# Patient Record
Sex: FEMALE | Race: WHITE | NOT HISPANIC OR LATINO | Employment: OTHER | URBAN - METROPOLITAN AREA
[De-identification: names, ages, dates, MRNs, and addresses within clinical notes are randomized per-mention and may not be internally consistent; named-entity substitution may affect disease eponyms.]

---

## 2017-04-07 DIAGNOSIS — I10 ESSENTIAL HYPERTENSION: Primary | ICD-10-CM

## 2017-04-07 RX ORDER — METOPROLOL SUCCINATE 25 MG/1
TABLET, EXTENDED RELEASE ORAL
Qty: 90 TABLET | Refills: 0 | Status: SHIPPED | OUTPATIENT
Start: 2017-04-07 | End: 2017-07-14 | Stop reason: SDUPTHER

## 2017-05-30 ENCOUNTER — OFFICE VISIT (OUTPATIENT)
Dept: INTERNAL MEDICINE | Age: 82
End: 2017-05-30

## 2017-05-30 VITALS
DIASTOLIC BLOOD PRESSURE: 72 MMHG | BODY MASS INDEX: 31.86 KG/M2 | WEIGHT: 191.2 LBS | HEART RATE: 66 BPM | OXYGEN SATURATION: 97 % | HEIGHT: 65 IN | TEMPERATURE: 97.4 F | SYSTOLIC BLOOD PRESSURE: 160 MMHG

## 2017-05-30 DIAGNOSIS — I10 ESSENTIAL HYPERTENSION: Primary | ICD-10-CM

## 2017-05-30 DIAGNOSIS — E66.9 ADIPOSITY: ICD-10-CM

## 2017-05-30 PROCEDURE — 99213 OFFICE O/P EST LOW 20 MIN: CPT | Performed by: INTERNAL MEDICINE

## 2017-05-31 DIAGNOSIS — E83.52 HYPERCALCEMIA: Primary | ICD-10-CM

## 2017-05-31 LAB
ALBUMIN SERPL-MCNC: 4.7 G/DL (ref 3.5–5.2)
ALBUMIN/GLOB SERPL: 1.6 G/DL
ALP SERPL-CCNC: 66 U/L (ref 39–117)
ALT SERPL-CCNC: 21 U/L (ref 1–33)
AST SERPL-CCNC: 22 U/L (ref 1–32)
BILIRUB SERPL-MCNC: 0.5 MG/DL (ref 0.1–1.2)
BUN SERPL-MCNC: 27 MG/DL (ref 8–23)
BUN/CREAT SERPL: 27.6 (ref 7–25)
CALCIUM SERPL-MCNC: 10.6 MG/DL (ref 8.2–9.6)
CHLORIDE SERPL-SCNC: 104 MMOL/L (ref 98–107)
CO2 SERPL-SCNC: 28.8 MMOL/L (ref 22–29)
CREAT SERPL-MCNC: 0.98 MG/DL (ref 0.57–1)
GLOBULIN SER CALC-MCNC: 2.9 GM/DL
GLUCOSE SERPL-MCNC: 107 MG/DL (ref 65–99)
POTASSIUM SERPL-SCNC: 4.4 MMOL/L (ref 3.5–5.2)
PROT SERPL-MCNC: 7.6 G/DL (ref 6–8.5)
SODIUM SERPL-SCNC: 146 MMOL/L (ref 136–145)

## 2017-06-05 ENCOUNTER — RESULTS ENCOUNTER (OUTPATIENT)
Dept: INTERNAL MEDICINE | Age: 82
End: 2017-06-05

## 2017-06-05 DIAGNOSIS — E83.52 HYPERCALCEMIA: ICD-10-CM

## 2017-06-27 DIAGNOSIS — I10 ESSENTIAL HYPERTENSION: ICD-10-CM

## 2017-06-27 RX ORDER — TRIAMTERENE AND HYDROCHLOROTHIAZIDE 37.5; 25 MG/1; MG/1
TABLET ORAL
Qty: 30 TABLET | Refills: 3 | Status: SHIPPED | OUTPATIENT
Start: 2017-06-27 | End: 2017-12-27 | Stop reason: SDUPTHER

## 2017-07-11 ENCOUNTER — TELEPHONE (OUTPATIENT)
Dept: INTERNAL MEDICINE | Age: 82
End: 2017-07-11

## 2017-07-14 DIAGNOSIS — I10 ESSENTIAL HYPERTENSION: ICD-10-CM

## 2017-07-14 RX ORDER — METOPROLOL SUCCINATE 25 MG/1
TABLET, EXTENDED RELEASE ORAL
Qty: 90 TABLET | Refills: 0 | Status: SHIPPED | OUTPATIENT
Start: 2017-07-14 | End: 2017-10-16 | Stop reason: SDUPTHER

## 2017-10-16 DIAGNOSIS — I10 ESSENTIAL HYPERTENSION: ICD-10-CM

## 2017-10-16 RX ORDER — METOPROLOL SUCCINATE 25 MG/1
TABLET, EXTENDED RELEASE ORAL
Qty: 90 TABLET | Refills: 0 | Status: SHIPPED | OUTPATIENT
Start: 2017-10-16 | End: 2018-04-29 | Stop reason: SDUPTHER

## 2017-11-30 ENCOUNTER — OFFICE VISIT (OUTPATIENT)
Dept: INTERNAL MEDICINE | Age: 82
End: 2017-11-30

## 2017-11-30 VITALS
HEIGHT: 65 IN | TEMPERATURE: 98.1 F | HEART RATE: 62 BPM | WEIGHT: 190.4 LBS | DIASTOLIC BLOOD PRESSURE: 72 MMHG | BODY MASS INDEX: 31.72 KG/M2 | SYSTOLIC BLOOD PRESSURE: 116 MMHG | OXYGEN SATURATION: 95 %

## 2017-11-30 DIAGNOSIS — E83.52 HYPERCALCEMIA: ICD-10-CM

## 2017-11-30 DIAGNOSIS — I10 ESSENTIAL HYPERTENSION: Primary | ICD-10-CM

## 2017-11-30 DIAGNOSIS — R60.0 LOCALIZED EDEMA: ICD-10-CM

## 2017-11-30 DIAGNOSIS — E66.09 CLASS 1 OBESITY DUE TO EXCESS CALORIES WITHOUT SERIOUS COMORBIDITY WITH BODY MASS INDEX (BMI) OF 31.0 TO 31.9 IN ADULT: ICD-10-CM

## 2017-11-30 PROCEDURE — 99214 OFFICE O/P EST MOD 30 MIN: CPT | Performed by: INTERNAL MEDICINE

## 2017-11-30 RX ORDER — ERGOCALCIFEROL (VITAMIN D2) 10 MCG
400 TABLET ORAL DAILY
COMMUNITY

## 2017-11-30 NOTE — PROGRESS NOTES
"Sarah VAZQUEZ Omayra / 89 y.o. / female  11/30/2017  VITALS    /72 (BP Location: Left arm, Patient Position: Sitting, Cuff Size: Adult)  Pulse 62  Temp 98.1 °F (36.7 °C) (Oral)   Ht 65\" (165.1 cm)  Wt 190 lb 6.4 oz (86.4 kg)  SpO2 95%  BMI 31.68 kg/m2    BP Readings from Last 3 Encounters:   11/30/17 116/72   05/30/17 160/72   11/29/16 135/65     Wt Readings from Last 3 Encounters:   11/30/17 190 lb 6.4 oz (86.4 kg)   05/30/17 191 lb 3.2 oz (86.7 kg)   11/29/16 194 lb 11.2 oz (88.3 kg)      Body mass index is 31.68 kg/(m^2).    CC:  Main reason(s) for today's visit: Hypertension      HPI:     She presents today to follow-up on hypertension.  When last seen by me, her blood pressure is well-controlled.  Today she is compliant with medication, and dietary salt restriction.  She walks at least 3 times weekly, but does not monitor blood pressure the outpatient setting.  There are no medication side effects noted at this time, patient did have one episode of orthostasis, which seemed to improve with increased fluid.  We did discuss the importance of maintaining adequate fluid intake since she is on a diuretic.    Patient has had 2 minor elevations of calcium in June 2016 and May 2017.  Calcium was 9.5 and 10.6.  This will be updated today.  In the meantime, patient has reduced her calcium intake voluntarily.    Patient Care Team:  Dewayne Coe MD as PCP - General  Dewayne Coe MD as PCP - Family Medicine  Dewayne Coe MD as PCP - Claims Attributed  ____________________________________________________________________    ASSESSMENT & PLAN:    Problem List Items Addressed This Visit        Unprioritized    Edema    Hypertension - Primary    Relevant Medications    triamterene-hydrochlorothiazide (MAXZIDE-25) 37.5-25 MG per tablet    metoprolol succinate XL (TOPROL-XL) 25 MG 24 hr tablet    Adiposity    Hypercalcemia    Overview     10.6 May 31, 2017         Relevant Orders    PTH, Intact & Calcium    "     Orders Placed This Encounter   Procedures   • PTH, Intact & Calcium       Summary/Discussion:  · Number-one hypertension stable on current medication.  Plan: Continue same, blood pressure check 6 months.  ·   · #2 weight control, needs improvement, patient advised to watch caloric intake at this point.  ·   · #3 history of hypercalcemia times one, we'll check calcium and PTH today, if still elevated, we may need to discontinue the vitamin D supplement.  ·   · #4 localized edema secondary to dependency, patient advised to elevate her legs when not active.    Return in about 6 months (around 5/30/2018) for Blood pressure check.    No future appointments.    ______________________________________________________    REVIEW OF SYSTEMS    Review of Systems   Respiratory: Negative for chest tightness and shortness of breath.    Cardiovascular: Negative for chest pain and palpitations.   Neurological: Negative for dizziness, syncope, speech difficulty, weakness, light-headedness and headaches.         PHYSICAL EXAMINATION    Physical Exam   Constitutional: She is oriented to person, place, and time. She appears well-developed and well-nourished. No distress.   Cardiovascular: Normal rate, regular rhythm, normal heart sounds and intact distal pulses.  Exam reveals no gallop and no friction rub.    No murmur heard.  Pulmonary/Chest: Effort normal and breath sounds normal. She has no wheezes. She has no rales.   Musculoskeletal: She exhibits edema.   Trace bilateral lower extremity pitting edema   Neurological: She is alert and oriented to person, place, and time.   Skin: Skin is warm and dry. No rash noted.   Psychiatric: She has a normal mood and affect. Her behavior is normal. Judgment and thought content normal.   Nursing note and vitals reviewed.      REVIEWED DATA:    Labs:     Lab Results   Component Value Date     (H) 05/30/2017    K 4.4 05/30/2017    AST 22 05/30/2017    ALT 21 05/30/2017    BUN 27 (H)  05/30/2017    CREATININE 0.98 05/30/2017    CREATININE 0.90 06/21/2016    EGFRIFNONA 53 (L) 05/30/2017    EGFRIFAFRI 64 05/30/2017       Lab Results   Component Value Date     (H) 05/30/2017     (H) 06/21/2016       No results found for: LDL, HDL, TRIG, CHOLHDLRATIO    No results found for: TSH, FREET4    Lab Results   Component Value Date    WBC >30 (A) 06/21/2016       Imaging:         Medical Tests:         Summary of old records / correspondence / consultant report:         Request outside records:         ALLERGIES  Allergies   Allergen Reactions   • Penicillins Itching   • Sulfamethoxazole-Trimethoprim         MEDICATIONS  Current Outpatient Prescriptions   Medication Sig Dispense Refill   • diphenhydrAMINE-acetaminophen (TYLENOL PM)  MG tablet per tablet Take 1 tablet by mouth at night as needed for sleep.     • metoprolol succinate XL (TOPROL-XL) 25 MG 24 hr tablet TAKE ONE TABLET BY MOUTH ONCE DAILY 90 tablet 0   • triamterene-hydrochlorothiazide (MAXZIDE-25) 37.5-25 MG per tablet TAKE ONE TABLET BY MOUTH ONCE DAILY 30 tablet 3   • Vitamin D, Cholecalciferol, (CHOLECALCIFEROL) 400 units tablet Take 400 Units by mouth Daily.       No current facility-administered medications for this visit.        PFSH:     The following portions of the patient's history were reviewed and updated as appropriate: Allergies / Current Medications / Past Medical History / Surgical History / Social History / Family History    PROBLEM LIST   Patient Active Problem List   Diagnosis   • Postartificial menopausal syndrome   • Edema   • Hypertension   • Ovarian failure   • Localized edema   • Urinary frequency   • Osteoarthritis   • Adiposity   • Encounter for immunization   • Orthostasis   • Hypercalcemia       PAST MEDICAL HISTORY  Past Medical History:   Diagnosis Date   • History of degenerative disc disease    • Obesity        SURGICAL HISTORY  Past Surgical History:   Procedure Laterality Date   •  CHOLECYSTECTOMY     • REPLACEMENT TOTAL KNEE         SOCIAL HISTORY  Social History     Social History   • Marital status:      Spouse name: N/A   • Number of children: 4   • Years of education: N/A     Occupational History   • Retired RN      Social History Main Topics   • Smoking status: Former Smoker     Types: Cigarettes     Quit date:    • Smokeless tobacco: Never Used   • Alcohol use No   • Drug use: None   • Sexual activity: Not Asked     Other Topics Concern   • None     Social History Narrative       FAMILY HISTORY  Family History   Problem Relation Age of Onset   • Cancer Other    • Diabetes Other    • Heart disease Other    • Stroke Sister               **Lisa Disclaimer:   Much of this encounter note is an electronic transcription/translation of spoken language to printed text. The electronic translation of spoken language may permit erroneous, or at times, nonsensical words or phrases to be inadvertently transcribed. Although I have reviewed the note for such errors, some may still exist.

## 2017-12-01 ENCOUNTER — TELEPHONE (OUTPATIENT)
Dept: INTERNAL MEDICINE | Age: 82
End: 2017-12-01

## 2017-12-01 LAB
CALCIUM SERPL-MCNC: 9.7 MG/DL (ref 8.7–10.3)
INTACT PTH: NORMAL
PTH-INTACT SERPL-MCNC: 41 PG/ML (ref 15–65)

## 2017-12-01 NOTE — TELEPHONE ENCOUNTER
----- Message from Dewayne Coe MD sent at 12/1/2017  2:42 PM EST -----  Laboratory is acceptable at this time, no further evaluation is required .  Please continue present diet and meds/supplements.

## 2017-12-27 DIAGNOSIS — I10 ESSENTIAL HYPERTENSION: ICD-10-CM

## 2017-12-28 RX ORDER — TRIAMTERENE AND HYDROCHLOROTHIAZIDE 37.5; 25 MG/1; MG/1
TABLET ORAL
Qty: 30 TABLET | Refills: 5 | Status: SHIPPED | OUTPATIENT
Start: 2017-12-28 | End: 2019-02-19

## 2018-01-26 ENCOUNTER — TELEPHONE (OUTPATIENT)
Dept: INTERNAL MEDICINE | Age: 83
End: 2018-01-26

## 2018-01-26 NOTE — TELEPHONE ENCOUNTER
Pt called stating she is not urinating very often. She took a diuretic pill yesterday and this morning. When she does go a little its clear. No other issues. Pt said she is drinking water. She said she would keep taking the diuretic.  Pt's # [phone]  Thanks SP

## 2018-01-26 NOTE — TELEPHONE ENCOUNTER
Since she has no symptoms of urinary tract infection, nor signs of fluid retention, and has urinated very recently, I suggest we just observe this over the weekend to see if this worsens.  If she develops any difficulty with urination as far as pain, odor, bleeding to suggest infection, suggest that she present to the immediate care center.  If she develops any problems with shortness of breath, swelling in her ankles, patient should also be seen in the immediate care center over the weekend.  if not update us on Monday with her symptoms.

## 2018-01-26 NOTE — TELEPHONE ENCOUNTER
Updated info: Currently takes maxzide 37.5/25 Pt denied the following; burning, pain, smell, spasms, hematuria, CP SOA, edema BLE.  Not current with a urologist.   Last micturition was 1/26/18 @ 0945 good stream and clear in color

## 2018-01-29 ENCOUNTER — TELEPHONE (OUTPATIENT)
Dept: INTERNAL MEDICINE | Age: 83
End: 2018-01-29

## 2018-01-29 NOTE — TELEPHONE ENCOUNTER
Pt stated she was to call back today on how she was feeling regarding a UTI. Pt said all issues have resolved and she is feeling fine.  Pt's # [phone]  Thanks SP

## 2018-04-29 DIAGNOSIS — I10 ESSENTIAL HYPERTENSION: ICD-10-CM

## 2018-04-30 RX ORDER — METOPROLOL SUCCINATE 25 MG/1
TABLET, EXTENDED RELEASE ORAL
Qty: 90 TABLET | Refills: 0 | Status: SHIPPED | OUTPATIENT
Start: 2018-04-30 | End: 2018-08-13 | Stop reason: SDUPTHER

## 2018-07-03 ENCOUNTER — TELEPHONE (OUTPATIENT)
Dept: INTERNAL MEDICINE | Age: 83
End: 2018-07-03

## 2018-07-03 NOTE — TELEPHONE ENCOUNTER
Pt called stating for over a week now she has been having pain in middle of her back that has gotten worse. Pt said she is also constipated. Denies any chest pain, no s.o.a, no nausea.  Pt's # [phone]  Thanks SP

## 2018-07-03 NOTE — TELEPHONE ENCOUNTER
Called pt and she said, her  and her have already decided to go to [facility].  Thanks SP

## 2018-07-05 ENCOUNTER — APPOINTMENT (OUTPATIENT)
Dept: GENERAL RADIOLOGY | Facility: HOSPITAL | Age: 83
End: 2018-07-05

## 2018-07-05 PROCEDURE — 72110 X-RAY EXAM L-2 SPINE 4/>VWS: CPT | Performed by: GENERAL PRACTICE

## 2018-08-13 DIAGNOSIS — I10 ESSENTIAL HYPERTENSION: ICD-10-CM

## 2018-08-13 RX ORDER — METOPROLOL SUCCINATE 25 MG/1
TABLET, EXTENDED RELEASE ORAL
Qty: 30 TABLET | Refills: 0 | Status: SHIPPED | OUTPATIENT
Start: 2018-08-13 | End: 2018-09-12 | Stop reason: SDUPTHER

## 2018-09-12 DIAGNOSIS — I10 ESSENTIAL HYPERTENSION: ICD-10-CM

## 2018-09-12 RX ORDER — METOPROLOL SUCCINATE 25 MG/1
TABLET, EXTENDED RELEASE ORAL
Qty: 30 TABLET | Refills: 0 | Status: SHIPPED | OUTPATIENT
Start: 2018-09-12 | End: 2018-10-15 | Stop reason: SDUPTHER

## 2018-10-15 DIAGNOSIS — I10 ESSENTIAL HYPERTENSION: ICD-10-CM

## 2018-10-16 RX ORDER — METOPROLOL SUCCINATE 25 MG/1
25 TABLET, EXTENDED RELEASE ORAL DAILY
Qty: 15 TABLET | Refills: 0 | Status: SHIPPED | OUTPATIENT
Start: 2018-10-16 | End: 2019-03-22 | Stop reason: SDUPTHER

## 2018-11-07 DIAGNOSIS — I10 ESSENTIAL HYPERTENSION: ICD-10-CM

## 2018-11-07 RX ORDER — METOPROLOL SUCCINATE 25 MG/1
TABLET, EXTENDED RELEASE ORAL
Qty: 15 TABLET | Refills: 0 | OUTPATIENT
Start: 2018-11-07

## 2018-11-12 DIAGNOSIS — I10 ESSENTIAL HYPERTENSION: ICD-10-CM

## 2018-11-12 RX ORDER — METOPROLOL SUCCINATE 25 MG/1
TABLET, EXTENDED RELEASE ORAL
Qty: 30 TABLET | Refills: 0 | Status: SHIPPED | OUTPATIENT
Start: 2018-11-12 | End: 2019-02-19

## 2019-01-11 RX ORDER — METOPROLOL SUCCINATE 25 MG/1
TABLET, EXTENDED RELEASE ORAL
Qty: 30 TABLET | Refills: 1 | Status: SHIPPED | OUTPATIENT
Start: 2019-01-11 | End: 2019-02-19

## 2019-02-19 ENCOUNTER — OFFICE VISIT (OUTPATIENT)
Dept: FAMILY MEDICINE CLINIC | Facility: CLINIC | Age: 84
End: 2019-02-19

## 2019-02-19 VITALS
BODY MASS INDEX: 28.79 KG/M2 | TEMPERATURE: 98.7 F | DIASTOLIC BLOOD PRESSURE: 70 MMHG | HEIGHT: 68 IN | HEART RATE: 78 BPM | WEIGHT: 190 LBS | OXYGEN SATURATION: 96 % | SYSTOLIC BLOOD PRESSURE: 140 MMHG

## 2019-02-19 DIAGNOSIS — I10 ESSENTIAL HYPERTENSION: ICD-10-CM

## 2019-02-19 DIAGNOSIS — K59.04 CHRONIC IDIOPATHIC CONSTIPATION: Primary | ICD-10-CM

## 2019-02-19 DIAGNOSIS — M79.642 PAIN IN BOTH HANDS: ICD-10-CM

## 2019-02-19 DIAGNOSIS — M79.641 PAIN IN BOTH HANDS: ICD-10-CM

## 2019-02-19 PROBLEM — E83.52 HYPERCALCEMIA: Status: RESOLVED | Noted: 2017-05-31 | Resolved: 2019-02-19

## 2019-02-19 PROCEDURE — 99214 OFFICE O/P EST MOD 30 MIN: CPT | Performed by: FAMILY MEDICINE

## 2019-02-19 RX ORDER — ASPIRIN 81 MG/1
81 TABLET ORAL DAILY
COMMUNITY

## 2019-02-19 RX ORDER — DOCUSATE SODIUM 100 MG/1
100 CAPSULE, LIQUID FILLED ORAL 2 TIMES DAILY PRN
Qty: 60 CAPSULE | Refills: 2 | Status: SHIPPED | OUTPATIENT
Start: 2019-02-19 | End: 2019-08-19

## 2019-02-19 NOTE — PROGRESS NOTES
Sarah Cutler is a 89 y.o. female.     Chief Complaint   Patient presents with   • Establish Care     new pt establishing today with dr varela   • Constipation     pt has hard time to go bathroom for the past month   • Hypertension     follow up no complain p[t has question about bp medication    • Hand Pain     finger tips pain bilateral and pain some swelling an numbness       HPI     Pt is a pleasant 89 y.o. YO female here for HTN, constipation, and hand pain.  New patient to me and this office.  PMH includes hypertension well-controlled, obesity stable, osteoarthritis well-controlled, edema that has been chronic especially on the left side of the ankle without cause, and palpitations that are well controlled metoprolol..    HTN: well controlled    Constipation: worsening, drinking 1 glass of water per day, has had constipation - may have one every 2-3 days with some explosions with taking milk of magnesium.  She has been drinking prune juice    Hand pain - feels like there is pins and needles that is bilaterally, not worsening, episodic and rare.  She does not have a history of diabetes, last glucose was negative.  Feels more like zinging in her fingertips that moves around, rare.    The following portions of the patient's history were reviewed and updated as appropriate: allergies, current medications, past family history, past medical history, past social history, past surgical history and problem list.    Review of Systems   Constitutional: Negative.    HENT: Negative.    Respiratory: Negative.    Cardiovascular: Negative for leg swelling.   Gastrointestinal: Positive for constipation.   Endocrine: Negative.    Musculoskeletal: Positive for arthralgias, joint swelling and myalgias.   Allergic/Immunologic: Negative.    Neurological: Negative.    Hematological: Negative.    Psychiatric/Behavioral: Negative.        Objective  Vitals:    02/19/19 1323   BP: 140/70   Pulse: 78   Temp: 98.7 °F (37.1 °C)    SpO2: 96%        Physical Exam   Constitutional: She is oriented to person, place, and time. She appears well-developed and well-nourished. No distress.   HENT:   Head: Normocephalic.   Nose: Nose normal.   Eyes: EOM are normal.   Cardiovascular: Normal rate, regular rhythm, normal heart sounds and intact distal pulses.   No murmur heard.  Pulmonary/Chest: Effort normal and breath sounds normal. No respiratory distress.   Musculoskeletal: Normal range of motion.   Neurological: She is alert and oriented to person, place, and time.   Skin: Skin is warm and dry. No rash noted.   Psychiatric: She has a normal mood and affect. Her behavior is normal. Judgment and thought content normal.   Nursing note and vitals reviewed.        Current Outpatient Medications:   •  acetaminophen (TYLENOL) 325 MG tablet, Take 650 mg by mouth., Disp: , Rfl:   •  aspirin 81 MG EC tablet, Take 81 mg by mouth Daily., Disp: , Rfl:   •  diphenhydrAMINE-acetaminophen (TYLENOL PM)  MG tablet per tablet, Take 1 tablet by mouth at night as needed for sleep., Disp: , Rfl:   •  metoprolol succinate XL (TOPROL-XL) 25 MG 24 hr tablet, Take 1 tablet by mouth Daily. *LAST REFILL UNTIL PATIENT MAKES APPOINTMENT*, Disp: 15 tablet, Rfl: 0  •  Vitamin D, Cholecalciferol, (CHOLECALCIFEROL) 400 units tablet, Take 400 Units by mouth Daily., Disp: , Rfl:   •  docusate sodium (COLACE) 100 MG capsule, Take 1 capsule by mouth 2 (Two) Times a Day As Needed for Constipation., Disp: 60 capsule, Rfl: 2    Procedures    Lab Results (most recent)     None              Sarah was seen today for establish care, constipation, hypertension and hand pain.    Diagnoses and all orders for this visit:    Chronic idiopathic constipation  -     docusate sodium (COLACE) 100 MG capsule; Take 1 capsule by mouth 2 (Two) Times a Day As Needed for Constipation.    Essential hypertension    Pain in both hands    Constipation not well controlled, increase water intake, continue  with prunes and fiber supplementation.  Start with Colace    Hypertension well controlled.  She is on metoprolol XL 25 mg daily.  She was previously on triamterene hydrochlorothiazide for pedal edema but was discontinued secondary to orthostasis.  She has been doing well since, goal blood pressure less than 150/90.  She has no cardiovascular disease.  She does occasionally get palpitations that returned when she had a trial off metoprolol.    Bilateral numbness and tingling with pins and needles in both hands that is rare and episodic.  Normal exam and sensation today.  Recommended to start sublingual B12 supplementation, if not improving return for further evaluation.  Last CMP with normal glucose.  No history of diabetes.  Otherwise reassured patient.    Return in about 6 months (around 8/19/2019), or if symptoms worsen or fail to improve, for Recheck HTN .      Danna Michael MD

## 2019-03-22 DIAGNOSIS — I10 ESSENTIAL HYPERTENSION: ICD-10-CM

## 2019-03-22 RX ORDER — METOPROLOL SUCCINATE 25 MG/1
25 TABLET, EXTENDED RELEASE ORAL DAILY
Qty: 90 TABLET | Refills: 2 | Status: SHIPPED | OUTPATIENT
Start: 2019-03-22 | End: 2019-10-08 | Stop reason: SDUPTHER

## 2019-05-22 ENCOUNTER — TELEPHONE (OUTPATIENT)
Dept: FAMILY MEDICINE CLINIC | Facility: CLINIC | Age: 84
End: 2019-05-22

## 2019-05-22 NOTE — TELEPHONE ENCOUNTER
I think if it is a new lesion and has not improved or has been changing it should be seen.  I am glad to see her or we can refer to dermatology as I will be out of the office for the next couple weeks.  Thank you

## 2019-05-22 NOTE — TELEPHONE ENCOUNTER
Pt established care back in february. Pt forgot to mention she has a tiny blood blister on her left breast, no pain, but it's May and it hasn't gone away. Should pt be concerned?

## 2019-05-23 DIAGNOSIS — S20.122A: Primary | ICD-10-CM

## 2019-08-19 ENCOUNTER — OFFICE VISIT (OUTPATIENT)
Dept: FAMILY MEDICINE CLINIC | Facility: CLINIC | Age: 84
End: 2019-08-19

## 2019-08-19 VITALS
HEART RATE: 60 BPM | OXYGEN SATURATION: 98 % | BODY MASS INDEX: 29.4 KG/M2 | TEMPERATURE: 97.5 F | WEIGHT: 194 LBS | SYSTOLIC BLOOD PRESSURE: 154 MMHG | DIASTOLIC BLOOD PRESSURE: 72 MMHG | HEIGHT: 68 IN

## 2019-08-19 DIAGNOSIS — E55.9 HYPOVITAMINOSIS D: ICD-10-CM

## 2019-08-19 DIAGNOSIS — I10 ESSENTIAL HYPERTENSION: Primary | ICD-10-CM

## 2019-08-19 DIAGNOSIS — L23.89 ALLERGIC CONTACT DERMATITIS DUE TO OTHER AGENTS: ICD-10-CM

## 2019-08-19 DIAGNOSIS — R60.0 PEDAL EDEMA: ICD-10-CM

## 2019-08-19 PROCEDURE — 99214 OFFICE O/P EST MOD 30 MIN: CPT | Performed by: FAMILY MEDICINE

## 2019-08-19 RX ORDER — TRIAMCINOLONE ACETONIDE 5 MG/G
OINTMENT TOPICAL 2 TIMES DAILY
Qty: 45 G | Refills: 3 | Status: SHIPPED | OUTPATIENT
Start: 2019-08-19

## 2019-08-19 RX ORDER — HYDROCHLOROTHIAZIDE 12.5 MG/1
12.5 TABLET ORAL DAILY
Qty: 90 TABLET | Refills: 2 | Status: SHIPPED | OUTPATIENT
Start: 2019-08-19 | End: 2020-05-08

## 2019-08-19 RX ORDER — POTASSIUM CHLORIDE 20 MEQ/1
20 TABLET, EXTENDED RELEASE ORAL DAILY
Qty: 90 TABLET | Refills: 2 | Status: SHIPPED | OUTPATIENT
Start: 2019-08-19 | End: 2020-05-08

## 2019-08-19 NOTE — PROGRESS NOTES
Sarah Cutler is a 89 y.o. female.     Chief Complaint   Patient presents with   • Hypertension     follow up no complaisn   • Rash     arms and back rash itches and vhas red bumps       HPI     Pt is a pleasant 89 y.o. YO female here for HTN and rash.      HTN: uncontrolled.  Chronic problem for years, asymptomatic, no chest pain, palpitation or SOA.  Adherent with medications: Toprol 25 mg daily.  She was on triamterene hydrochlorothiazide previously and became orthostatic.  No adverse effects from medications.    Rash on her arms that is been present for 3 months, waxes and wanes tends to be associated when she goes outside and is bitten by various bugs.  She does notice that they resolve on their own but are extremely pruritic.  See her dermatologist who gave her a prescription for desonide.    The following portions of the patient's history were reviewed and updated as appropriate: allergies, current medications, past family history, past medical history, past social history, past surgical history and problem list.    Review of Systems   Constitutional: Negative.    HENT: Negative.    Eyes: Negative.    Respiratory: Negative.    Cardiovascular: Positive for leg swelling.   Gastrointestinal: Negative.    Endocrine: Negative.    Genitourinary: Negative.    Musculoskeletal: Positive for arthralgias.   Skin: Positive for rash.   Allergic/Immunologic: Negative.    Neurological: Negative.    Hematological: Negative.    Psychiatric/Behavioral: Negative.        Objective  Vitals:    08/19/19 1230   BP: 154/72   Pulse: 60   Temp: 97.5 °F (36.4 °C)   SpO2: 98%        Physical Exam   Constitutional: She is oriented to person, place, and time. She appears well-developed and well-nourished. No distress.   HENT:   Head: Normocephalic.   Nose: Nose normal.   Eyes: EOM are normal.   Cardiovascular: Normal rate, regular rhythm, normal heart sounds and intact distal pulses.   No murmur heard.  Pulmonary/Chest: Effort  normal and breath sounds normal. No respiratory distress.   Musculoskeletal: Normal range of motion. She exhibits edema.   Neurological: She is alert and oriented to person, place, and time.   Skin: Skin is warm and dry. No rash noted.   Psychiatric: She has a normal mood and affect. Her behavior is normal. Judgment and thought content normal.   Nursing note and vitals reviewed.        Current Outpatient Medications:   •  acetaminophen (TYLENOL) 325 MG tablet, Take 650 mg by mouth., Disp: , Rfl:   •  aspirin 81 MG EC tablet, Take 81 mg by mouth Daily., Disp: , Rfl:   •  desonide (DESOWEN) 0.05 % ointment, Apply  topically to the appropriate area as directed 2 (Two) Times a Day., Disp: 15 g, Rfl: 0  •  diphenhydrAMINE-acetaminophen (TYLENOL PM)  MG tablet per tablet, Take 1 tablet by mouth at night as needed for sleep., Disp: , Rfl:   •  metoprolol succinate XL (TOPROL-XL) 25 MG 24 hr tablet, Take 1 tablet by mouth Daily., Disp: 90 tablet, Rfl: 2  •  Vitamin D, Cholecalciferol, (CHOLECALCIFEROL) 400 units tablet, Take 400 Units by mouth Daily., Disp: , Rfl:   •  hydrochlorothiazide (HYDRODIURIL) 12.5 MG tablet, Take 1 tablet by mouth Daily., Disp: 90 tablet, Rfl: 2  •  potassium chloride (K-DUR,KLOR-CON) 20 MEQ CR tablet, Take 1 tablet by mouth Daily., Disp: 90 tablet, Rfl: 2  •  triamcinolone (KENALOG) 0.5 % ointment, Apply  topically to the appropriate area as directed 2 (Two) Times a Day., Disp: 45 g, Rfl: 3    Procedures    Lab Results (most recent)     None              Sarah was seen today for hypertension and rash.    Diagnoses and all orders for this visit:    Essential hypertension  -     hydrochlorothiazide (HYDRODIURIL) 12.5 MG tablet; Take 1 tablet by mouth Daily.  -     Basic Metabolic Panel    Pedal edema  -     hydrochlorothiazide (HYDRODIURIL) 12.5 MG tablet; Take 1 tablet by mouth Daily.    Allergic contact dermatitis due to other agents  -     triamcinolone (KENALOG) 0.5 % ointment; Apply   topically to the appropriate area as directed 2 (Two) Times a Day.    Hypovitaminosis D  -     Vitamin D 25 Hydroxy    Other orders  -     potassium chloride (K-DUR,KLOR-CON) 20 MEQ CR tablet; Take 1 tablet by mouth Daily.      Was not 89-year-old female here for hypertension follow-up.  Hypertension, not well controlled, we discussed that monotherapy with beta-blocker is not ideal, she is doing well overall.  She is also having significant pedal edema, about +2 bilaterally.  Prescription for hydrochlorothiazide 12.5 mg given for pedal edema and slight improvement in hypertension.  She will maintain her oral hydration and start potassium 4 replacement as well.    Patient with allergic contact dermatitis on the bilateral arms likely from getting bug bites.  Okay to use triamcinolone steroid cream for pruritus.    Return in about 3 months (around 11/19/2019), or if symptoms worsen or fail to improve, for Recheck HTN  .      Danna Michael MD

## 2019-08-20 LAB
25(OH)D3+25(OH)D2 SERPL-MCNC: 51.7 NG/ML (ref 30–100)
BUN SERPL-MCNC: 19 MG/DL (ref 8–23)
BUN/CREAT SERPL: 22.4 (ref 7–25)
CALCIUM SERPL-MCNC: 9.5 MG/DL (ref 8.2–9.6)
CHLORIDE SERPL-SCNC: 108 MMOL/L (ref 98–107)
CO2 SERPL-SCNC: 25.8 MMOL/L (ref 22–29)
CREAT SERPL-MCNC: 0.85 MG/DL (ref 0.57–1)
GLUCOSE SERPL-MCNC: 107 MG/DL (ref 65–99)
POTASSIUM SERPL-SCNC: 4.8 MMOL/L (ref 3.5–5.2)
SODIUM SERPL-SCNC: 146 MMOL/L (ref 136–145)

## 2019-08-20 NOTE — PROGRESS NOTES
Please call patient back with results.  The labs has resulted as overall normal and similar to prior.  Continue routine follow-up as we discussed in the office.    Thank you

## 2019-10-08 DIAGNOSIS — I10 ESSENTIAL HYPERTENSION: ICD-10-CM

## 2019-10-08 RX ORDER — METOPROLOL SUCCINATE 25 MG/1
25 TABLET, EXTENDED RELEASE ORAL DAILY
Qty: 90 TABLET | Refills: 0 | Status: SHIPPED | OUTPATIENT
Start: 2019-10-08 | End: 2020-01-10 | Stop reason: SDUPTHER

## 2019-12-03 ENCOUNTER — OFFICE VISIT (OUTPATIENT)
Dept: FAMILY MEDICINE CLINIC | Facility: CLINIC | Age: 84
End: 2019-12-03

## 2019-12-03 VITALS
DIASTOLIC BLOOD PRESSURE: 84 MMHG | SYSTOLIC BLOOD PRESSURE: 136 MMHG | HEART RATE: 65 BPM | TEMPERATURE: 97.9 F | BODY MASS INDEX: 29.55 KG/M2 | WEIGHT: 195 LBS | OXYGEN SATURATION: 98 % | HEIGHT: 68 IN

## 2019-12-03 DIAGNOSIS — G56.01 CARPAL TUNNEL SYNDROME OF RIGHT WRIST: ICD-10-CM

## 2019-12-03 DIAGNOSIS — N39.3 STRESS INCONTINENCE IN FEMALE: ICD-10-CM

## 2019-12-03 DIAGNOSIS — I10 ESSENTIAL HYPERTENSION: Primary | ICD-10-CM

## 2019-12-03 PROCEDURE — 99214 OFFICE O/P EST MOD 30 MIN: CPT | Performed by: FAMILY MEDICINE

## 2019-12-03 NOTE — PROGRESS NOTES
Sarah Cutler is a 89 y.o. female.     Chief Complaint   Patient presents with   • Hypertension     follow up no complains    • Urinary Incontinence     pt has hard trouble to control the urine has to uses pads all the time    • Hand Pain     right hand pain and numbness more often than normal        HPI     Pt is a pleasant 89 y.o. YO female here for multiple issues.    HTN well controlled, thick, adherent with hydrochlorothiazide and metoprolol.  No adverse effects.  No symptoms of dizziness or lightheadedness.  Blood pressures usually in the 130s over 80s    urinary incontinence that is worsening, go to the bathroom and not able to go the restroom fast enough .    weakness of the L leg - using her cane all the time but has not fallen.       R hand with a numbness pain, started over the past couple of months, R handed numbness and difficulty ringer gripping things, it tends to improve with ringing out her hands or massage.  Does not seem to be associated with specific movements in her neck but does seem to be associated at rest or watching TV.  She has not tried anything yet to treat her symptoms.    The following portions of the patient's history were reviewed and updated as appropriate: allergies, current medications, past family history, past medical history, past social history, past surgical history and problem list.    Review of Systems   Constitutional: Negative.    HENT: Negative.    Eyes: Negative.    Respiratory: Negative.    Cardiovascular: Positive for leg swelling. Negative for chest pain and palpitations.   Gastrointestinal: Negative.    Endocrine: Negative.    Genitourinary: Positive for frequency.   Musculoskeletal: Positive for arthralgias and myalgias (right hand).   Skin: Negative.    Allergic/Immunologic: Negative.    Neurological: Positive for numbness.   Hematological: Negative.    Psychiatric/Behavioral: Negative.        Objective  Vitals:    12/03/19 1115   BP: 136/84   Pulse: 65    Temp: 97.9 °F (36.6 °C)   SpO2: 98%        Physical Exam   Constitutional: She is oriented to person, place, and time. She appears well-developed and well-nourished. No distress.   HENT:   Head: Normocephalic.   Nose: Nose normal.   Eyes: EOM are normal.   Cardiovascular: Normal rate, regular rhythm, normal heart sounds and intact distal pulses.   No murmur heard.  Pulmonary/Chest: Effort normal and breath sounds normal. No respiratory distress.   Musculoskeletal: Normal range of motion.   Neurological: She is alert and oriented to person, place, and time.   Skin: Skin is warm and dry. No rash noted.   Psychiatric: She has a normal mood and affect. Her behavior is normal. Judgment and thought content normal.   Nursing note and vitals reviewed.        Current Outpatient Medications:   •  acetaminophen (TYLENOL) 325 MG tablet, Take 650 mg by mouth., Disp: , Rfl:   •  aspirin 81 MG EC tablet, Take 81 mg by mouth Daily., Disp: , Rfl:   •  desonide (DESOWEN) 0.05 % ointment, Apply  topically to the appropriate area as directed 2 (Two) Times a Day., Disp: 15 g, Rfl: 0  •  diphenhydrAMINE-acetaminophen (TYLENOL PM)  MG tablet per tablet, Take 1 tablet by mouth at night as needed for sleep., Disp: , Rfl:   •  hydrochlorothiazide (HYDRODIURIL) 12.5 MG tablet, Take 1 tablet by mouth Daily., Disp: 90 tablet, Rfl: 2  •  metoprolol succinate XL (TOPROL-XL) 25 MG 24 hr tablet, TAKE 1 TABLET BY MOUTH DAILY, Disp: 90 tablet, Rfl: 0  •  potassium chloride (K-DUR,KLOR-CON) 20 MEQ CR tablet, Take 1 tablet by mouth Daily., Disp: 90 tablet, Rfl: 2  •  triamcinolone (KENALOG) 0.5 % ointment, Apply  topically to the appropriate area as directed 2 (Two) Times a Day., Disp: 45 g, Rfl: 3  •  Vitamin D, Cholecalciferol, (CHOLECALCIFEROL) 400 units tablet, Take 400 Units by mouth Daily., Disp: , Rfl:     Procedures    Lab Results (most recent)     None              Sarah was seen today for hypertension, urinary incontinence and hand  pain.    Diagnoses and all orders for this visit:    Essential hypertension    Stress incontinence in female    Carpal tunnel syndrome of right wrist      HTN well controlled, Continue current meds with no changes.  Labs done in August we will recheck in 6 months.    Urinary incontinence, likely combination of stress and urge incontinence.  Discussed possible treatments.  Plan to do Keagle exercises and scheduled urinary breaks.  Will not start anticholinergic meds.    Carpal tunnel likely on the right, there may be a component of radiation of pain from the neck but more than likely related to compression of the median nerve.  Discussed wrist splints and massages with exercises that help restore feeling to the fingers.  She does not want to do any invasive treatments including steroid shots.  Would not want to see a hand surgeon at this time.    Return in about 6 months (around 6/3/2020), or if symptoms worsen or fail to improve, for Recheck HTN and hand pain .      Danna Michael MD

## 2020-01-10 DIAGNOSIS — I10 ESSENTIAL HYPERTENSION: ICD-10-CM

## 2020-01-10 RX ORDER — METOPROLOL SUCCINATE 25 MG/1
25 TABLET, EXTENDED RELEASE ORAL DAILY
Qty: 90 TABLET | Refills: 0 | Status: SHIPPED | OUTPATIENT
Start: 2020-01-10 | End: 2020-05-08

## 2020-02-03 ENCOUNTER — TELEPHONE (OUTPATIENT)
Dept: FAMILY MEDICINE CLINIC | Facility: CLINIC | Age: 85
End: 2020-02-03

## 2020-02-03 NOTE — TELEPHONE ENCOUNTER
"Sarah fell about two weeks ago. EMS called, helped pt, pt did not go to the ER. Pt's knees and legs are bruised. She is walking in a \"wobbly\" manner while using her walker. Sarah first made complaints of confusion yesterday. Pt is currently scheduled tomorrow at 8am for evaluation. Gisselle (daughter) believes the alter mental state/confusion is not an emergency? What do you advise?   "

## 2020-02-04 NOTE — TELEPHONE ENCOUNTER
Spoke with Sarah. There was a misunderstanding, Gisselle (daughter) informed Sarah to wait on a response, she did not believe any appt was scheduled even though I spoke directly with Gisselle and repeated the date and time of the appointment.     Sarah is now back home and states that she is feeling much better. She stated that she felt dizzy and confused and that cause her fall. She is walking better with the assistance of her walker. I advised Sarah with any of those symptoms she should seek care immediately at nearest ER. Sarah agreed and will call back to schedule to follow up.

## 2020-02-04 NOTE — TELEPHONE ENCOUNTER
Patient did not show to appointment.  I do recommend that she be seen soon especially with altered mental status.  I would imagine agree with recommendations to go to the emergency room as you have told them.

## 2020-05-08 DIAGNOSIS — R60.0 PEDAL EDEMA: ICD-10-CM

## 2020-05-08 DIAGNOSIS — I10 ESSENTIAL HYPERTENSION: ICD-10-CM

## 2020-05-08 RX ORDER — HYDROCHLOROTHIAZIDE 12.5 MG/1
12.5 TABLET ORAL DAILY
Qty: 90 TABLET | Refills: 2 | Status: SHIPPED | OUTPATIENT
Start: 2020-05-08 | End: 2021-02-02

## 2020-05-08 RX ORDER — POTASSIUM CHLORIDE 20 MEQ/1
20 TABLET, EXTENDED RELEASE ORAL DAILY
Qty: 90 TABLET | Refills: 2 | Status: SHIPPED | OUTPATIENT
Start: 2020-05-08 | End: 2021-02-02

## 2020-05-08 RX ORDER — METOPROLOL SUCCINATE 25 MG/1
25 TABLET, EXTENDED RELEASE ORAL DAILY
Qty: 90 TABLET | Refills: 0 | Status: SHIPPED | OUTPATIENT
Start: 2020-05-08 | End: 2020-08-06

## 2020-08-06 DIAGNOSIS — I10 ESSENTIAL HYPERTENSION: ICD-10-CM

## 2020-08-06 RX ORDER — METOPROLOL SUCCINATE 25 MG/1
25 TABLET, EXTENDED RELEASE ORAL DAILY
Qty: 90 TABLET | Refills: 0 | Status: SHIPPED | OUTPATIENT
Start: 2020-08-06 | End: 2020-11-09 | Stop reason: SDUPTHER

## 2020-11-09 DIAGNOSIS — I10 ESSENTIAL HYPERTENSION: ICD-10-CM

## 2020-11-09 RX ORDER — METOPROLOL SUCCINATE 25 MG/1
25 TABLET, EXTENDED RELEASE ORAL DAILY
Qty: 30 TABLET | Refills: 0 | Status: SHIPPED | OUTPATIENT
Start: 2020-11-09 | End: 2020-12-08

## 2020-11-09 NOTE — TELEPHONE ENCOUNTER
Caller: Omayra Sarah VAZQUEZ    Relationship: Self    Best call back number: [phone]    Medication needed:   Requested Prescriptions     Pending Prescriptions Disp Refills   • metoprolol succinate XL (TOPROL-XL) 25 MG 24 hr tablet 90 tablet 0     Sig: Take 1 tablet by mouth Daily.       When do you need the refill by: 11/13/20    What details did the patient provide when requesting the medication: PATIENT SAID SHE TRIED TO GET REFILL AND PHARMACY DID NOT RECEIVE REQUEST. SHE HAS ABOUT 6 PILLS LEFT.    Does the patient have less than a 3 day supply:  [] Yes  [x] No    What is the patient's preferred pharmacy: [id number] - [place], [place] - [address] [address] [facility] [address] [place] [address]  - [phone] [facility] [phone] FX

## 2020-12-08 DIAGNOSIS — I10 ESSENTIAL HYPERTENSION: ICD-10-CM

## 2020-12-08 RX ORDER — METOPROLOL SUCCINATE 25 MG/1
25 TABLET, EXTENDED RELEASE ORAL DAILY
Qty: 30 TABLET | Refills: 0 | Status: SHIPPED | OUTPATIENT
Start: 2020-12-08 | End: 2021-01-08 | Stop reason: SDUPTHER

## 2020-12-16 ENCOUNTER — OFFICE VISIT (OUTPATIENT)
Dept: FAMILY MEDICINE CLINIC | Facility: CLINIC | Age: 85
End: 2020-12-16

## 2020-12-16 DIAGNOSIS — I10 ESSENTIAL HYPERTENSION: Primary | ICD-10-CM

## 2020-12-16 DIAGNOSIS — E55.9 HYPOVITAMINOSIS D: ICD-10-CM

## 2020-12-16 DIAGNOSIS — M79.641 HAND PAIN, RIGHT: ICD-10-CM

## 2020-12-16 PROCEDURE — 99441 PR PHYS/QHP TELEPHONE EVALUATION 5-10 MIN: CPT | Performed by: FAMILY MEDICINE

## 2020-12-16 NOTE — PROGRESS NOTES
You have chosen to receive care through a telephone visit today. Do you consent to use a telephone visit for your medical care today? Yes     Pt is a pleasant 89 y.o. YO female here for televisit for htn not well control. r hand pain  -she feels as if blood is dripping down her hand without anything occurring on the skin.  This has been going on for about a year.  Recommended that she follow-up for these issues, she is concerned about Covid right now does not want to come in the office.  Discussed getting a blood pressure cuff at home with goal blood pressure less than 150/90.  Continue with current meds.  Also due for labs but will wait to do them when numbers improve    Medication list reviewed    Diagnoses and all orders for this visit:    1. Essential hypertension (Primary)  -     Comprehensive Metabolic Panel  -     Lipid Panel  -     CBC & Differential    2. Hand pain, right    3. Hypovitaminosis D  -     Vitamin D 25 Hydroxy       Follow-up in 3 months for hypertension and right hand pain.  Fasting labs prior to appointment.    Instructed patient to call the office if symptoms are not improving.  Warnings to go to emergency room given.    This visit has been rescheduled as a phone visit to comply with patient safety concerns in accordance with CDC recommendations. Total time of discussion was 6 minutes.    Return in about 3 months (around 3/16/2021), or if symptoms worsen or fail to improve, for Recheck HTN with fasting labs prior .    Danna Michael MD

## 2021-01-08 DIAGNOSIS — I10 ESSENTIAL HYPERTENSION: ICD-10-CM

## 2021-01-08 RX ORDER — METOPROLOL SUCCINATE 25 MG/1
25 TABLET, EXTENDED RELEASE ORAL DAILY
Qty: 30 TABLET | Refills: 0 | Status: SHIPPED | OUTPATIENT
Start: 2021-01-08 | End: 2021-01-11 | Stop reason: SDUPTHER

## 2021-01-08 NOTE — TELEPHONE ENCOUNTER
Caller: Sarah Cutler    Relationship: Self    Best call back number: [phone]     Medication needed:   Requested Prescriptions     Pending Prescriptions Disp Refills   • metoprolol succinate XL (TOPROL-XL) 25 MG 24 hr tablet 30 tablet 0     Sig: Take 1 tablet by mouth Daily.       When do you need the refill by: 01/08/21    Does the patient have less than a 3 day supply:  [x] Yes  [] No    What is the patient's preferred pharmacy: [id number] - [place] - [address] AT [facility] [address] [address]  - [phone]  - [phone]

## 2021-01-11 DIAGNOSIS — I10 ESSENTIAL HYPERTENSION: ICD-10-CM

## 2021-01-11 RX ORDER — METOPROLOL SUCCINATE 25 MG/1
25 TABLET, EXTENDED RELEASE ORAL DAILY
Qty: 90 TABLET | Refills: 1 | Status: SHIPPED | OUTPATIENT
Start: 2021-01-11

## 2021-02-02 DIAGNOSIS — I10 ESSENTIAL HYPERTENSION: ICD-10-CM

## 2021-02-02 DIAGNOSIS — R60.0 PEDAL EDEMA: ICD-10-CM

## 2021-02-02 RX ORDER — HYDROCHLOROTHIAZIDE 12.5 MG/1
12.5 TABLET ORAL DAILY
Qty: 90 TABLET | Refills: 1 | Status: SHIPPED | OUTPATIENT
Start: 2021-02-02

## 2021-02-02 RX ORDER — POTASSIUM CHLORIDE 20 MEQ/1
20 TABLET, EXTENDED RELEASE ORAL DAILY
Qty: 90 TABLET | Refills: 1 | Status: SHIPPED | OUTPATIENT
Start: 2021-02-02